# Patient Record
Sex: FEMALE | Race: WHITE | NOT HISPANIC OR LATINO | URBAN - METROPOLITAN AREA
[De-identification: names, ages, dates, MRNs, and addresses within clinical notes are randomized per-mention and may not be internally consistent; named-entity substitution may affect disease eponyms.]

---

## 2018-06-01 ENCOUNTER — OUTPATIENT (OUTPATIENT)
Dept: OUTPATIENT SERVICES | Facility: HOSPITAL | Age: 68
LOS: 1 days | Discharge: HOME | End: 2018-06-01
Payer: MEDICARE

## 2018-06-01 VITALS
WEIGHT: 262.57 LBS | TEMPERATURE: 98 F | SYSTOLIC BLOOD PRESSURE: 113 MMHG | HEIGHT: 65 IN | HEART RATE: 69 BPM | RESPIRATION RATE: 16 BRPM | OXYGEN SATURATION: 97 % | DIASTOLIC BLOOD PRESSURE: 73 MMHG

## 2018-06-01 DIAGNOSIS — Z98.890 OTHER SPECIFIED POSTPROCEDURAL STATES: Chronic | ICD-10-CM

## 2018-06-01 DIAGNOSIS — Z01.818 ENCOUNTER FOR OTHER PREPROCEDURAL EXAMINATION: ICD-10-CM

## 2018-06-01 DIAGNOSIS — Z90.49 ACQUIRED ABSENCE OF OTHER SPECIFIED PARTS OF DIGESTIVE TRACT: Chronic | ICD-10-CM

## 2018-06-01 DIAGNOSIS — E78.5 HYPERLIPIDEMIA, UNSPECIFIED: ICD-10-CM

## 2018-06-01 DIAGNOSIS — E03.9 HYPOTHYROIDISM, UNSPECIFIED: ICD-10-CM

## 2018-06-01 DIAGNOSIS — M19.011 PRIMARY OSTEOARTHRITIS, RIGHT SHOULDER: ICD-10-CM

## 2018-06-01 DIAGNOSIS — I10 ESSENTIAL (PRIMARY) HYPERTENSION: ICD-10-CM

## 2018-06-01 DIAGNOSIS — Z90.89 ACQUIRED ABSENCE OF OTHER ORGANS: Chronic | ICD-10-CM

## 2018-06-01 DIAGNOSIS — Z96.612 PRESENCE OF LEFT ARTIFICIAL SHOULDER JOINT: Chronic | ICD-10-CM

## 2018-06-01 DIAGNOSIS — E11.9 TYPE 2 DIABETES MELLITUS WITHOUT COMPLICATIONS: ICD-10-CM

## 2018-06-01 DIAGNOSIS — Z90.710 ACQUIRED ABSENCE OF BOTH CERVIX AND UTERUS: Chronic | ICD-10-CM

## 2018-06-01 DIAGNOSIS — Z98.51 TUBAL LIGATION STATUS: Chronic | ICD-10-CM

## 2018-06-01 LAB
ANION GAP SERPL CALC-SCNC: 6 MMOL/L — SIGNIFICANT CHANGE UP (ref 5–17)
APTT BLD: 29.5 SEC — SIGNIFICANT CHANGE UP (ref 27.5–37.4)
BASOPHILS # BLD AUTO: 0.03 K/UL — SIGNIFICANT CHANGE UP (ref 0–0.2)
BASOPHILS NFR BLD AUTO: 0.5 % — SIGNIFICANT CHANGE UP (ref 0–2)
BUN SERPL-MCNC: 16 MG/DL — SIGNIFICANT CHANGE UP (ref 7–23)
CALCIUM SERPL-MCNC: 9.3 MG/DL — SIGNIFICANT CHANGE UP (ref 8.5–10.1)
CHLORIDE SERPL-SCNC: 107 MMOL/L — SIGNIFICANT CHANGE UP (ref 96–108)
CO2 SERPL-SCNC: 31 MMOL/L — SIGNIFICANT CHANGE UP (ref 22–31)
CREAT SERPL-MCNC: 0.73 MG/DL — SIGNIFICANT CHANGE UP (ref 0.5–1.3)
EOSINOPHIL # BLD AUTO: 0.16 K/UL — SIGNIFICANT CHANGE UP (ref 0–0.5)
EOSINOPHIL NFR BLD AUTO: 2.6 % — SIGNIFICANT CHANGE UP (ref 0–6)
GLUCOSE SERPL-MCNC: 98 MG/DL — SIGNIFICANT CHANGE UP (ref 70–99)
HBA1C BLD-MCNC: 5.8 % — HIGH (ref 4–5.6)
HCT VFR BLD CALC: 38.7 % — SIGNIFICANT CHANGE UP (ref 34.5–45)
HGB BLD-MCNC: 12.1 G/DL — SIGNIFICANT CHANGE UP (ref 11.5–15.5)
IMM GRANULOCYTES NFR BLD AUTO: 0.2 % — SIGNIFICANT CHANGE UP (ref 0–1.5)
INR BLD: 1.04 RATIO — SIGNIFICANT CHANGE UP (ref 0.88–1.16)
LYMPHOCYTES # BLD AUTO: 1.71 K/UL — SIGNIFICANT CHANGE UP (ref 1–3.3)
LYMPHOCYTES # BLD AUTO: 27.7 % — SIGNIFICANT CHANGE UP (ref 13–44)
MCHC RBC-ENTMCNC: 29.1 PG — SIGNIFICANT CHANGE UP (ref 27–34)
MCHC RBC-ENTMCNC: 31.3 GM/DL — LOW (ref 32–36)
MCV RBC AUTO: 93 FL — SIGNIFICANT CHANGE UP (ref 80–100)
MONOCYTES # BLD AUTO: 0.46 K/UL — SIGNIFICANT CHANGE UP (ref 0–0.9)
MONOCYTES NFR BLD AUTO: 7.5 % — SIGNIFICANT CHANGE UP (ref 2–14)
MRSA PCR RESULT.: SIGNIFICANT CHANGE UP
NEUTROPHILS # BLD AUTO: 3.8 K/UL — SIGNIFICANT CHANGE UP (ref 1.8–7.4)
NEUTROPHILS NFR BLD AUTO: 61.5 % — SIGNIFICANT CHANGE UP (ref 43–77)
PLATELET # BLD AUTO: 273 K/UL — SIGNIFICANT CHANGE UP (ref 150–400)
POTASSIUM SERPL-MCNC: 4.1 MMOL/L — SIGNIFICANT CHANGE UP (ref 3.5–5.3)
POTASSIUM SERPL-SCNC: 4.1 MMOL/L — SIGNIFICANT CHANGE UP (ref 3.5–5.3)
PROTHROM AB SERPL-ACNC: 11.3 SEC — SIGNIFICANT CHANGE UP (ref 9.8–12.7)
RBC # BLD: 4.16 M/UL — SIGNIFICANT CHANGE UP (ref 3.8–5.2)
RBC # FLD: 13.3 % — SIGNIFICANT CHANGE UP (ref 10.3–14.5)
S AUREUS DNA NOSE QL NAA+PROBE: DETECTED
SODIUM SERPL-SCNC: 144 MMOL/L — SIGNIFICANT CHANGE UP (ref 135–145)
WBC # BLD: 6.17 K/UL — SIGNIFICANT CHANGE UP (ref 3.8–10.5)
WBC # FLD AUTO: 6.17 K/UL — SIGNIFICANT CHANGE UP (ref 3.8–10.5)

## 2018-06-01 PROCEDURE — 93010 ELECTROCARDIOGRAM REPORT: CPT | Mod: NC

## 2018-06-01 RX ORDER — ATORVASTATIN CALCIUM 80 MG/1
1 TABLET, FILM COATED ORAL
Qty: 0 | Refills: 0 | COMMUNITY

## 2018-06-01 RX ORDER — AMLODIPINE BESYLATE 2.5 MG/1
1 TABLET ORAL
Qty: 0 | Refills: 0 | COMMUNITY

## 2018-06-01 RX ORDER — NADOLOL 80 MG/1
1 TABLET ORAL
Qty: 0 | Refills: 0 | COMMUNITY

## 2018-06-01 RX ORDER — LIRAGLUTIDE 6 MG/ML
1 INJECTION SUBCUTANEOUS
Qty: 0 | Refills: 0 | COMMUNITY

## 2018-06-01 RX ORDER — LEVOTHYROXINE SODIUM 125 MCG
1 TABLET ORAL
Qty: 0 | Refills: 0 | COMMUNITY

## 2018-06-01 RX ORDER — ASCORBIC ACID 60 MG
1 TABLET,CHEWABLE ORAL
Qty: 0 | Refills: 0 | COMMUNITY

## 2018-06-01 RX ORDER — OMEPRAZOLE 10 MG/1
1 CAPSULE, DELAYED RELEASE ORAL
Qty: 0 | Refills: 0 | COMMUNITY

## 2018-06-01 RX ORDER — CHOLECALCIFEROL (VITAMIN D3) 125 MCG
1 CAPSULE ORAL
Qty: 0 | Refills: 0 | COMMUNITY

## 2018-06-01 NOTE — H&P PST ADULT - HISTORY OF PRESENT ILLNESS
68F pmh htn, dm, gerd, hypothyroid, hl here for PST for scheduled Right total shoulder arthroplasty with open acromioclaviculare resection

## 2018-06-01 NOTE — H&P PST ADULT - NSANTHOSAYNRD_GEN_A_CORE
No. CAR screening performed.  STOP BANG Legend: 0-2 = LOW Risk; 3-4 = INTERMEDIATE Risk; 5-8 = HIGH Risk

## 2018-06-01 NOTE — H&P PST ADULT - ASSESSMENT
68F pmh htn, dm, gerd, hypothyroid, hl here for PST for scheduled Right total shoulder arthroplasty with open acromioclaviculare resection 68F pmh htn, dm, gerd, hypothyroid, hl here for PST for scheduled Right total shoulder arthroplasty with open acromioclaviculare resection    CAPRINI SCORE    AGE RELATED RISK FACTORS                                                       MOBILITY RELATED FACTORS  [ ] Age 41-60 years                                            (1 Point)                  [ ] Bed rest                                                        (1 Point)  [x ] Age: 61-74 years                                           (2 Points)                [ ] Plaster cast                                                   (2 Points)  [ ] Age= 75 years                                              (3 Points)                 [ ] Bed bound for more than 72 hours                   (2 Points)    DISEASE RELATED RISK FACTORS                                               GENDER SPECIFIC FACTORS  [x ] Edema in the lower extremities                       (1 Point)                  [ ] Pregnancy                                                     (1 Point)  [ ] Varicose veins                                               (1 Point)                  [ ] Post-partum < 6 weeks                                   (1 Point)             [x ] BMI > 25 Kg/m2                                            (1 Point)                  [ ] Hormonal therapy  or oral contraception            (1 Point)                 [ ] Sepsis (in the previous month)                        (1 Point)                  [ ] History of pregnancy complications  [ ] Pneumonia or serious lung disease                                               [ ] Unexplained or recurrent                       (1 Point)           (in the previous month)                               (1 Point)  [ ] Abnormal pulmonary function test                     (1 Point)                 SURGERY RELATED RISK FACTORS  [ ] Acute myocardial infarction                              (1 Point)                 [ ]  Section                                            (1 Point)  [ ] Congestive heart failure (in the previous month)  (1 Point)                 [ ] Minor surgery                                                 (1 Point)   [ ] Inflammatory bowel disease                             (1 Point)                 [ ] Arthroscopic surgery                                        (2 Points)  [ ] Central venous access                                    (2 Points)                [ ] General surgery lasting more than 45 minutes   (2 Points)       [ ] Stroke (in the previous month)                          (5 Points)               [x ] Elective arthroplasty                                        (5 Points)                                                                                                                                               HEMATOLOGY RELATED FACTORS                                                 TRAUMA RELATED RISK FACTORS  [ ] Prior episodes of VTE                                     (3 Points)                 [ ] Fracture of the hip, pelvis, or leg                       (5 Points)  [ ] Positive family history for VTE                         (3 Points)                 [ ] Acute spinal cord injury (in the previous month)  (5 Points)  [ ] Prothrombin 28568 A                                      (3 Points)                 [ ] Paralysis  (less than 1 month)                          (5 Points)  [ ] Factor V Leiden                                             (3 Points)                 [ ] Multiple Trauma within 1 month                         (5 Points)  [ ] Lupus anticoagulants                                     (3 Points)                                                           [ ] Anticardiolipin antibodies                                (3 Points)                                                       [ ] High homocysteine in the blood                      (3 Points)                                             [ ] Other congenital or acquired thrombophilia       (3 Points)                                                [ ] Heparin induced thrombocytopenia                  (3 Points)                                          Total Score [     9     ]

## 2018-06-01 NOTE — H&P PST ADULT - PSH
H/O carpal tunnel repair  bilaterally  S/P appendectomy  1964  S/P cholecystectomy  1967  S/P hysterectomy  1992  S/P shoulder replacement, left  2015  S/P shoulder surgery  Right ( 2002 )  S/P tonsillectomy  1960  S/P tubal ligation  1981  Status post trigger finger release  1986 and 1990

## 2018-06-01 NOTE — PATIENT PROFILE ADULT. - PSH
S/P appendectomy  1964  S/P cholecystectomy  1967  S/P hysterectomy  1992  S/P shoulder replacement, left  2015  S/P shoulder surgery  Right ( 2002 )  S/P tonsillectomy  1960  S/P tubal ligation  1981

## 2018-06-01 NOTE — H&P PST ADULT - PMH
Diabetes    GERD (gastroesophageal reflux disease)    Hyperlipidemia    Hypertension    Hypothyroidism    Migraines    Seasonal allergies

## 2018-06-03 RX ORDER — SODIUM CHLORIDE 9 MG/ML
3 INJECTION INTRAMUSCULAR; INTRAVENOUS; SUBCUTANEOUS EVERY 8 HOURS
Qty: 0 | Refills: 0 | Status: DISCONTINUED | OUTPATIENT
Start: 2018-06-11 | End: 2018-06-12

## 2018-06-03 RX ORDER — MUPIROCIN 20 MG/G
1 OINTMENT TOPICAL
Qty: 22 | Refills: 0 | OUTPATIENT
Start: 2018-06-03 | End: 2018-06-07

## 2018-06-10 ENCOUNTER — TRANSCRIPTION ENCOUNTER (OUTPATIENT)
Age: 68
End: 2018-06-10

## 2018-06-11 ENCOUNTER — TRANSCRIPTION ENCOUNTER (OUTPATIENT)
Age: 68
End: 2018-06-11

## 2018-06-11 ENCOUNTER — RESULT REVIEW (OUTPATIENT)
Age: 68
End: 2018-06-11

## 2018-06-11 ENCOUNTER — INPATIENT (INPATIENT)
Facility: HOSPITAL | Age: 68
LOS: 0 days | Discharge: HOME HEALTH | End: 2018-06-12
Attending: ORTHOPAEDIC SURGERY | Admitting: ORTHOPAEDIC SURGERY
Payer: MEDICARE

## 2018-06-11 VITALS
RESPIRATION RATE: 18 BRPM | WEIGHT: 265 LBS | DIASTOLIC BLOOD PRESSURE: 83 MMHG | TEMPERATURE: 97 F | HEIGHT: 65 IN | OXYGEN SATURATION: 98 % | HEART RATE: 69 BPM | SYSTOLIC BLOOD PRESSURE: 148 MMHG

## 2018-06-11 DIAGNOSIS — Z98.890 OTHER SPECIFIED POSTPROCEDURAL STATES: Chronic | ICD-10-CM

## 2018-06-11 DIAGNOSIS — Z98.51 TUBAL LIGATION STATUS: Chronic | ICD-10-CM

## 2018-06-11 DIAGNOSIS — Z96.612 PRESENCE OF LEFT ARTIFICIAL SHOULDER JOINT: Chronic | ICD-10-CM

## 2018-06-11 DIAGNOSIS — Z90.710 ACQUIRED ABSENCE OF BOTH CERVIX AND UTERUS: Chronic | ICD-10-CM

## 2018-06-11 DIAGNOSIS — E78.5 HYPERLIPIDEMIA, UNSPECIFIED: ICD-10-CM

## 2018-06-11 DIAGNOSIS — E11.9 TYPE 2 DIABETES MELLITUS WITHOUT COMPLICATIONS: ICD-10-CM

## 2018-06-11 DIAGNOSIS — I10 ESSENTIAL (PRIMARY) HYPERTENSION: ICD-10-CM

## 2018-06-11 DIAGNOSIS — Z90.49 ACQUIRED ABSENCE OF OTHER SPECIFIED PARTS OF DIGESTIVE TRACT: Chronic | ICD-10-CM

## 2018-06-11 DIAGNOSIS — E03.9 HYPOTHYROIDISM, UNSPECIFIED: ICD-10-CM

## 2018-06-11 DIAGNOSIS — M19.90 UNSPECIFIED OSTEOARTHRITIS, UNSPECIFIED SITE: ICD-10-CM

## 2018-06-11 DIAGNOSIS — Z90.89 ACQUIRED ABSENCE OF OTHER ORGANS: Chronic | ICD-10-CM

## 2018-06-11 PROCEDURE — 99223 1ST HOSP IP/OBS HIGH 75: CPT

## 2018-06-11 PROCEDURE — 88309 TISSUE EXAM BY PATHOLOGIST: CPT | Mod: 26

## 2018-06-11 PROCEDURE — 88311 DECALCIFY TISSUE: CPT | Mod: 26

## 2018-06-11 RX ORDER — DEXTROSE 50 % IN WATER 50 %
15 SYRINGE (ML) INTRAVENOUS ONCE
Qty: 0 | Refills: 0 | Status: DISCONTINUED | OUTPATIENT
Start: 2018-06-11 | End: 2018-06-12

## 2018-06-11 RX ORDER — ACETAMINOPHEN 500 MG
1000 TABLET ORAL ONCE
Qty: 0 | Refills: 0 | Status: COMPLETED | OUTPATIENT
Start: 2018-06-11 | End: 2018-06-11

## 2018-06-11 RX ORDER — OXYCODONE HYDROCHLORIDE 5 MG/1
5 TABLET ORAL EVERY 4 HOURS
Qty: 0 | Refills: 0 | Status: DISCONTINUED | OUTPATIENT
Start: 2018-06-11 | End: 2018-06-12

## 2018-06-11 RX ORDER — FENTANYL CITRATE 50 UG/ML
50 INJECTION INTRAVENOUS
Qty: 0 | Refills: 0 | Status: DISCONTINUED | OUTPATIENT
Start: 2018-06-11 | End: 2018-06-11

## 2018-06-11 RX ORDER — DEXTROSE 50 % IN WATER 50 %
25 SYRINGE (ML) INTRAVENOUS ONCE
Qty: 0 | Refills: 0 | Status: DISCONTINUED | OUTPATIENT
Start: 2018-06-11 | End: 2018-06-12

## 2018-06-11 RX ORDER — SODIUM CHLORIDE 9 MG/ML
1000 INJECTION, SOLUTION INTRAVENOUS
Qty: 0 | Refills: 0 | Status: DISCONTINUED | OUTPATIENT
Start: 2018-06-11 | End: 2018-06-11

## 2018-06-11 RX ORDER — AMLODIPINE BESYLATE 2.5 MG/1
5 TABLET ORAL DAILY
Qty: 0 | Refills: 0 | Status: DISCONTINUED | OUTPATIENT
Start: 2018-06-11 | End: 2018-06-12

## 2018-06-11 RX ORDER — TRANEXAMIC ACID 100 MG/ML
1000 INJECTION, SOLUTION INTRAVENOUS ONCE
Qty: 0 | Refills: 0 | Status: COMPLETED | OUTPATIENT
Start: 2018-06-11 | End: 2018-06-11

## 2018-06-11 RX ORDER — DEXTROSE 50 % IN WATER 50 %
12.5 SYRINGE (ML) INTRAVENOUS ONCE
Qty: 0 | Refills: 0 | Status: DISCONTINUED | OUTPATIENT
Start: 2018-06-11 | End: 2018-06-12

## 2018-06-11 RX ORDER — METOCLOPRAMIDE HCL 10 MG
10 TABLET ORAL ONCE
Qty: 0 | Refills: 0 | Status: DISCONTINUED | OUTPATIENT
Start: 2018-06-11 | End: 2018-06-11

## 2018-06-11 RX ORDER — DOCUSATE SODIUM 100 MG
100 CAPSULE ORAL THREE TIMES A DAY
Qty: 0 | Refills: 0 | Status: DISCONTINUED | OUTPATIENT
Start: 2018-06-11 | End: 2018-06-12

## 2018-06-11 RX ORDER — SODIUM CHLORIDE 9 MG/ML
1000 INJECTION, SOLUTION INTRAVENOUS
Qty: 0 | Refills: 0 | Status: DISCONTINUED | OUTPATIENT
Start: 2018-06-11 | End: 2018-06-12

## 2018-06-11 RX ORDER — LEVOTHYROXINE SODIUM 125 MCG
137 TABLET ORAL DAILY
Qty: 0 | Refills: 0 | Status: DISCONTINUED | OUTPATIENT
Start: 2018-06-11 | End: 2018-06-12

## 2018-06-11 RX ORDER — ASCORBIC ACID 60 MG
500 TABLET,CHEWABLE ORAL DAILY
Qty: 0 | Refills: 0 | Status: DISCONTINUED | OUTPATIENT
Start: 2018-06-11 | End: 2018-06-12

## 2018-06-11 RX ORDER — OXYCODONE HYDROCHLORIDE 5 MG/1
10 TABLET ORAL EVERY 4 HOURS
Qty: 0 | Refills: 0 | Status: DISCONTINUED | OUTPATIENT
Start: 2018-06-11 | End: 2018-06-12

## 2018-06-11 RX ORDER — CEFAZOLIN SODIUM 1 G
2000 VIAL (EA) INJECTION EVERY 8 HOURS
Qty: 0 | Refills: 0 | Status: COMPLETED | OUTPATIENT
Start: 2018-06-11 | End: 2018-06-11

## 2018-06-11 RX ORDER — MUPIROCIN 20 MG/G
1 OINTMENT TOPICAL
Qty: 0 | Refills: 0 | Status: DISCONTINUED | OUTPATIENT
Start: 2018-06-11 | End: 2018-06-11

## 2018-06-11 RX ORDER — ATORVASTATIN CALCIUM 80 MG/1
10 TABLET, FILM COATED ORAL AT BEDTIME
Qty: 0 | Refills: 0 | Status: DISCONTINUED | OUTPATIENT
Start: 2018-06-11 | End: 2018-06-12

## 2018-06-11 RX ORDER — METOCLOPRAMIDE HCL 10 MG
10 TABLET ORAL ONCE
Qty: 0 | Refills: 0 | Status: COMPLETED | OUTPATIENT
Start: 2018-06-11 | End: 2018-06-11

## 2018-06-11 RX ORDER — SODIUM CHLORIDE 9 MG/ML
1000 INJECTION INTRAMUSCULAR; INTRAVENOUS; SUBCUTANEOUS
Qty: 0 | Refills: 0 | Status: DISCONTINUED | OUTPATIENT
Start: 2018-06-11 | End: 2018-06-12

## 2018-06-11 RX ORDER — GLUCAGON INJECTION, SOLUTION 0.5 MG/.1ML
1 INJECTION, SOLUTION SUBCUTANEOUS ONCE
Qty: 0 | Refills: 0 | Status: DISCONTINUED | OUTPATIENT
Start: 2018-06-11 | End: 2018-06-12

## 2018-06-11 RX ORDER — INSULIN LISPRO 100/ML
VIAL (ML) SUBCUTANEOUS
Qty: 0 | Refills: 0 | Status: DISCONTINUED | OUTPATIENT
Start: 2018-06-11 | End: 2018-06-12

## 2018-06-11 RX ORDER — ASPIRIN/CALCIUM CARB/MAGNESIUM 324 MG
325 TABLET ORAL DAILY
Qty: 0 | Refills: 0 | Status: DISCONTINUED | OUTPATIENT
Start: 2018-06-11 | End: 2018-06-12

## 2018-06-11 RX ORDER — METOPROLOL TARTRATE 50 MG
25 TABLET ORAL
Qty: 0 | Refills: 0 | Status: DISCONTINUED | OUTPATIENT
Start: 2018-06-11 | End: 2018-06-12

## 2018-06-11 RX ORDER — PANTOPRAZOLE SODIUM 20 MG/1
40 TABLET, DELAYED RELEASE ORAL
Qty: 0 | Refills: 0 | Status: DISCONTINUED | OUTPATIENT
Start: 2018-06-11 | End: 2018-06-12

## 2018-06-11 RX ORDER — HYDROMORPHONE HYDROCHLORIDE 2 MG/ML
0.5 INJECTION INTRAMUSCULAR; INTRAVENOUS; SUBCUTANEOUS
Qty: 0 | Refills: 0 | Status: DISCONTINUED | OUTPATIENT
Start: 2018-06-11 | End: 2018-06-11

## 2018-06-11 RX ADMIN — SODIUM CHLORIDE 100 MILLILITER(S): 9 INJECTION, SOLUTION INTRAVENOUS at 10:33

## 2018-06-11 RX ADMIN — ATORVASTATIN CALCIUM 10 MILLIGRAM(S): 80 TABLET, FILM COATED ORAL at 21:11

## 2018-06-11 RX ADMIN — Medication 10 MILLIGRAM(S): at 11:17

## 2018-06-11 RX ADMIN — Medication 100 MILLIGRAM(S): at 21:11

## 2018-06-11 RX ADMIN — SODIUM CHLORIDE 3 MILLILITER(S): 9 INJECTION INTRAMUSCULAR; INTRAVENOUS; SUBCUTANEOUS at 13:49

## 2018-06-11 RX ADMIN — Medication 1000 MILLIGRAM(S): at 10:52

## 2018-06-11 RX ADMIN — Medication 137 MICROGRAM(S): at 16:58

## 2018-06-11 RX ADMIN — SODIUM CHLORIDE 75 MILLILITER(S): 9 INJECTION INTRAMUSCULAR; INTRAVENOUS; SUBCUTANEOUS at 17:32

## 2018-06-11 RX ADMIN — Medication 325 MILLIGRAM(S): at 13:47

## 2018-06-11 RX ADMIN — Medication 400 MILLIGRAM(S): at 10:33

## 2018-06-11 RX ADMIN — OXYCODONE HYDROCHLORIDE 10 MILLIGRAM(S): 5 TABLET ORAL at 20:06

## 2018-06-11 RX ADMIN — Medication 25 MILLIGRAM(S): at 17:32

## 2018-06-11 RX ADMIN — OXYCODONE HYDROCHLORIDE 10 MILLIGRAM(S): 5 TABLET ORAL at 21:06

## 2018-06-11 RX ADMIN — Medication 100 MILLIGRAM(S): at 13:47

## 2018-06-11 RX ADMIN — TRANEXAMIC ACID 220 MILLIGRAM(S): 100 INJECTION, SOLUTION INTRAVENOUS at 10:56

## 2018-06-11 RX ADMIN — SODIUM CHLORIDE 3 MILLILITER(S): 9 INJECTION INTRAMUSCULAR; INTRAVENOUS; SUBCUTANEOUS at 21:14

## 2018-06-11 RX ADMIN — Medication 2: at 16:58

## 2018-06-11 NOTE — DISCHARGE NOTE ADULT - CARE PROVIDER_API CALL
Elkin Marcelino), Orthopaedic Surgery  [address] [place] [address]  Phone: [phone]  Fax: [phone]

## 2018-06-11 NOTE — DISCHARGE NOTE ADULT - PLAN OF CARE
Improve Function,  Pain Keep Dressing Clean, Dry and Intact. May shower on POD#5 with Dressing on. Dressing may be removed on POD#7. Please do not scrub, soak, peel or pick at the dressing. No creams, lotions, or oils over dressing. May shower on POD#5 and let water run over dressing, no baths. Pat dry once out of shower.     If dressing is saturated from border to border. Remove dressing and cover with clean dry dressing.

## 2018-06-11 NOTE — DISCHARGE NOTE ADULT - PATIENT PORTAL LINK FT
You can access the [email or web address] Patient Portal, offered by [facility], by registering with the following website: [email or web address]

## 2018-06-11 NOTE — BRIEF OPERATIVE NOTE - PROCEDURE
<<-----Click on this checkbox to enter Procedure Shoulder arthroplasty, right  06/11/2018  S/P right total shoulder arthoplasty  Active  LUKAS

## 2018-06-11 NOTE — DISCHARGE NOTE ADULT - MEDICATION SUMMARY - MEDICATIONS TO TAKE
I will START or STAY ON the medications listed below when I get home from the hospital:    aspirin 325 mg oral tablet  -- 1 tab(s) by mouth once a day MDD:1 Tab  -- Indication: For RIGHT TOTAL SHOULDER ARTHROPLASTY WITH OPEN ACROMMIOCLAVICUL    oxyCODONE 10 mg oral tablet  -- 1 tab(s) by mouth every 4 hours, As needed, Moderate Pain MDD:6 Tabs  -- Indication: For RIGHT TOTAL SHOULDER ARTHROPLASTY WITH OPEN ACROMMIOCLAVICUL    enalapril 5 mg oral tablet  -- 1 tab(s) by mouth once a day  -- Indication: For RIGHT TOTAL SHOULDER ARTHROPLASTY WITH OPEN ACROMMIOCLAVICUL    Victoza  -- 1 dose(s) subcutaneous once a day  -- Indication: For RIGHT TOTAL SHOULDER ARTHROPLASTY WITH OPEN ACROMMIOCLAVICUL    atorvastatin 10 mg oral tablet  -- 1 tab(s) by mouth once a day  -- Indication: For RIGHT TOTAL SHOULDER ARTHROPLASTY WITH OPEN ACROMMIOCLAVICUL    nadolol 40 mg oral tablet  -- 1 tab(s) by mouth once a day  -- Indication: For RIGHT TOTAL SHOULDER ARTHROPLASTY WITH OPEN ACROMMIOCLAVICUL    amLODIPine 5 mg oral tablet  -- 1 tab(s) by mouth once a day  -- Indication: For RIGHT TOTAL SHOULDER ARTHROPLASTY WITH OPEN ACROMMIOCLAVICUL    docusate sodium 100 mg oral capsule  -- 1 cap(s) by mouth 3 times a day  -- Indication: For RIGHT TOTAL SHOULDER ARTHROPLASTY WITH OPEN ACROMMIOCLAVICUL    omeprazole 20 mg oral delayed release capsule  -- 1 cap(s) by mouth once a day  -- Indication: For RIGHT TOTAL SHOULDER ARTHROPLASTY WITH OPEN ACROMMIOCLAVICUL    levothyroxine 137 mcg (0.137 mg) oral tablet  -- 1 tab(s) by mouth once a day  -- Indication: For RIGHT TOTAL SHOULDER ARTHROPLASTY WITH OPEN ACROMMIOCLAVICUL    Multiple Vitamins oral tablet  -- 1 tab(s) by mouth once a day  -- Indication: For RIGHT TOTAL SHOULDER ARTHROPLASTY WITH OPEN ACROMMIOCLAVICUL    Vitamin C 500 mg oral tablet  -- 1 tab(s) by mouth once a day  -- Indication: For RIGHT TOTAL SHOULDER ARTHROPLASTY WITH OPEN ACROMMIOCLAVICUL    Vitamin D3 2000 intl units oral capsule  -- 1 cap(s) by mouth once a day  -- Indication: For RIGHT TOTAL SHOULDER ARTHROPLASTY WITH OPEN ACROMMIOCLAVICUL

## 2018-06-11 NOTE — DISCHARGE NOTE ADULT - MEDICATION SUMMARY - MEDICATIONS TO STOP TAKING
I will STOP taking the medications listed below when I get home from the hospital:    diclofenac  -- 75 milligram(s) by mouth once a day    aspirin 81 mg oral tablet  -- 1 tab(s) by mouth once a day    mupirocin 2% topical ointment  -- Apply on skin to affected area 2 times a day   Intranasaly  -- For external use only.

## 2018-06-11 NOTE — CONSULT NOTE ADULT - SUBJECTIVE AND OBJECTIVE BOX
HPI: Pt is a 67 yo female with ho OA, HTN, DM, hypothyroidism, HLD with chronic R shoulder pain now s/p R shoulder arthroplasty      PAST MEDICAL & SURGICAL HISTORY:  Migraines  Diabetes  GERD (gastroesophageal reflux disease)  Seasonal allergies  Hypothyroidism  Hypertension  Hyperlipidemia  Status post trigger finger release: 1986 and 1990  H/O carpal tunnel repair: bilaterally  S/P shoulder surgery: Right ( 2002 )  S/P shoulder replacement, left: 2015  S/P cholecystectomy: 1967  S/P tubal ligation: 1981  S/P hysterectomy: 1992  S/P appendectomy: 1964  S/P tonsillectomy: 1960      Review of Systems:   CONSTITUTIONAL: No fever, weight loss, or fatigue  EYES: No eye pain, visual disturbances, or discharge  ENMT:  No difficulty hearing, tinnitus, vertigo; No sinus or throat pain  NECK: No pain or stiffness  RESPIRATORY: No cough, wheezing, chills or hemoptysis; No shortness of breath  CARDIOVASCULAR: No chest pain, palpitations, dizziness, or leg swelling  GASTROINTESTINAL: No abdominal or epigastric pain. No nausea, vomiting, or hematemesis; No diarrhea or constipation. No melena or hematochezia.  GENITOURINARY: No dysuria, frequency, hematuria, or incontinence  NEUROLOGICAL: No headaches, memory loss, loss of strength, numbness, or tremors  SKIN: No itching, burning, rashes, or lesions   LYMPH NODES: No enlarged glands  ENDOCRINE: No heat or cold intolerance; No hair loss  MUSCULOSKELETAL: chronic R shoulder pain, limited ROM  PSYCHIATRIC: No depression, anxiety, mood swings, or difficulty sleeping  HEME/LYMPH: No easy bruising, or bleeding gums  ALLERGY AND IMMUNOLOGIC: No hives or eczema    Allergies    No Known Allergies    Intolerances    Zithromax (Headache)      Social History:     FAMILY HISTORY:      MEDICATIONS  (STANDING):  amLODIPine   Tablet 5 milliGRAM(s) Oral daily  ascorbic acid 500 milliGRAM(s) Oral daily  aspirin 325 milliGRAM(s) Oral daily  atorvastatin 10 milliGRAM(s) Oral at bedtime  ceFAZolin   IVPB 2000 milliGRAM(s) IV Intermittent every 8 hours  dextrose 5%. 1000 milliLiter(s) (50 mL/Hr) IV Continuous <Continuous>  dextrose 50% Injectable 12.5 Gram(s) IV Push once  dextrose 50% Injectable 25 Gram(s) IV Push once  dextrose 50% Injectable 25 Gram(s) IV Push once  docusate sodium 100 milliGRAM(s) Oral three times a day  enalapril 5 milliGRAM(s) Oral daily  insulin lispro (HumaLOG) corrective regimen sliding scale   SubCutaneous three times a day before meals  levothyroxine 137 MICROGram(s) Oral daily  multivitamin 1 Tablet(s) Oral daily  mupirocin 2% Ointment 1 Application(s) Topical two times a day  pantoprazole    Tablet 40 milliGRAM(s) Oral before breakfast  sodium chloride 0.9% lock flush 3 milliLiter(s) IV Push every 8 hours  sodium chloride 0.9%. 1000 milliLiter(s) (75 mL/Hr) IV Continuous <Continuous>    MEDICATIONS  (PRN):  dextrose 40% Gel 15 Gram(s) Oral once PRN Blood Glucose LESS THAN 70 milliGRAM(s)/deciliter  glucagon  Injectable 1 milliGRAM(s) IntraMuscular once PRN Glucose LESS THAN 70 milligrams/deciliter  oxyCODONE    IR 10 milliGRAM(s) Oral every 4 hours PRN Moderate Pain  oxyCODONE    IR 5 milliGRAM(s) Oral every 4 hours PRN Mild Pain        CAPILLARY BLOOD GLUCOSE  POCT Blood Glucose.: 113 mg/dL (11 Jun 2018 07:07)            PHYSICAL EXAM:  GENERAL: NAD, well-developed  HEAD:  Atraumatic, Normocephalic  EYES: EOMI, PERRLA, conjunctiva and sclera clear  NECK: Supple, No JVD  CHEST/LUNG: good AE ant/lat  HEART: Regular rate and rhythm  ABDOMEN: Soft, Nontender, Nondistended; Bowel sounds present  EXTREMITIES:  No clubbing, cyanosis, or edema; R shoulder bulky dressing in place with drain; bloody drainage  PSYCH: AAOx3  NEUROLOGY: able to move fingers R hand      LABS:

## 2018-06-11 NOTE — PROGRESS NOTE ADULT - SUBJECTIVE AND OBJECTIVE BOX
Post-op Check   POD#0 s/p Right TSA  68yFemale Patient seen and examined, Pain controlled  Patient Denies SOB, CP, N/V/D       PE: Right Shoulder/UE: Dressing C/D/I, Sensation/motor intact, Radial 2+, FROM wrist/fin                A: As above   P: Pain Control       DVT Prophylaxis      Incentive spirometry      PT NWB RUE      Isometric exercises      Discharge Planning      All the above discussed and understood by pt       Ortho to F/U Post-op Check   POD#0 s/p Right TSA  68yFemale Patient seen and examined, Pain controlled  Patient Denies SOB, CP, N/V/D       PE: Right Shoulder/UE: Dressing C/D/I, Sensation/motor intact, Radial 2+, Moving Fingers                A: As above   P: Pain Control       DVT Prophylaxis      Incentive spirometry      PT NWB RUE      Isometric exercises      Discharge Planning      All the above discussed and understood by pt       Ortho to F/U

## 2018-06-11 NOTE — DISCHARGE NOTE ADULT - HOSPITAL COURSE
68yFemale with history of Right Shoulder Pain presenting for Right TSA by Dr. licona [Date of Sx]. Risk and benefits of surgery were explained to the patient. The patient understood and agreed to proceed with surgery. Patient underwent the procedure with no intraoperative complications. Pt was brought in stable condition to the PACU. Once stable in PACU, pt was brought to the floor. During hospital stay pt was followed, during this admission. Pt had an uneventful hospital course. Pt is Stable for Discharge with Home Care Services and PT

## 2018-06-11 NOTE — DISCHARGE NOTE ADULT - CARE PLAN
Principal Discharge DX:	Osteoarthritis  Goal:	Improve Function,  Pain  Assessment and plan of treatment:	Keep Dressing Clean, Dry and Intact. May shower on POD#5 with Dressing on. Dressing may be removed on POD#7. Please do not scrub, soak, peel or pick at the dressing. No creams, lotions, or oils over dressing. May shower on POD#5 and let water run over dressing, no baths. Pat dry once out of shower.     If dressing is saturated from border to border. Remove dressing and cover with clean dry dressing.

## 2018-06-11 NOTE — CONSULT NOTE ADULT - PROBLEM SELECTOR RECOMMENDATION 3
nadolol not on formulary, spoke with pharmacy; 25 BID metoprolol is equivalent  cont enalapril, amlodipine

## 2018-06-12 VITALS
OXYGEN SATURATION: 97 % | TEMPERATURE: 98 F | SYSTOLIC BLOOD PRESSURE: 124 MMHG | HEART RATE: 63 BPM | RESPIRATION RATE: 18 BRPM | DIASTOLIC BLOOD PRESSURE: 75 MMHG

## 2018-06-12 LAB
ALBUMIN SERPL ELPH-MCNC: 3.1 G/DL — LOW (ref 3.3–5)
ALP SERPL-CCNC: 82 U/L — SIGNIFICANT CHANGE UP (ref 40–120)
ALT FLD-CCNC: 20 U/L — SIGNIFICANT CHANGE UP (ref 12–78)
ANION GAP SERPL CALC-SCNC: 8 MMOL/L — SIGNIFICANT CHANGE UP (ref 5–17)
AST SERPL-CCNC: 21 U/L — SIGNIFICANT CHANGE UP (ref 15–37)
BASOPHILS # BLD AUTO: 0.02 K/UL — SIGNIFICANT CHANGE UP (ref 0–0.2)
BASOPHILS NFR BLD AUTO: 0.1 % — SIGNIFICANT CHANGE UP (ref 0–2)
BILIRUB SERPL-MCNC: 0.3 MG/DL — SIGNIFICANT CHANGE UP (ref 0.2–1.2)
BUN SERPL-MCNC: 13 MG/DL — SIGNIFICANT CHANGE UP (ref 7–23)
CALCIUM SERPL-MCNC: 8.4 MG/DL — LOW (ref 8.5–10.1)
CHLORIDE SERPL-SCNC: 107 MMOL/L — SIGNIFICANT CHANGE UP (ref 96–108)
CO2 SERPL-SCNC: 27 MMOL/L — SIGNIFICANT CHANGE UP (ref 22–31)
CREAT SERPL-MCNC: 0.64 MG/DL — SIGNIFICANT CHANGE UP (ref 0.5–1.3)
EOSINOPHIL # BLD AUTO: 0 K/UL — SIGNIFICANT CHANGE UP (ref 0–0.5)
EOSINOPHIL NFR BLD AUTO: 0 % — SIGNIFICANT CHANGE UP (ref 0–6)
GLUCOSE SERPL-MCNC: 135 MG/DL — HIGH (ref 70–99)
HCT VFR BLD CALC: 32.6 % — LOW (ref 34.5–45)
HGB BLD-MCNC: 10.3 G/DL — LOW (ref 11.5–15.5)
IMM GRANULOCYTES NFR BLD AUTO: 0.4 % — SIGNIFICANT CHANGE UP (ref 0–1.5)
LYMPHOCYTES # BLD AUTO: 1.46 K/UL — SIGNIFICANT CHANGE UP (ref 1–3.3)
LYMPHOCYTES # BLD AUTO: 9.6 % — LOW (ref 13–44)
MCHC RBC-ENTMCNC: 29.4 PG — SIGNIFICANT CHANGE UP (ref 27–34)
MCHC RBC-ENTMCNC: 31.6 GM/DL — LOW (ref 32–36)
MCV RBC AUTO: 93.1 FL — SIGNIFICANT CHANGE UP (ref 80–100)
MONOCYTES # BLD AUTO: 1.3 K/UL — HIGH (ref 0–0.9)
MONOCYTES NFR BLD AUTO: 8.5 % — SIGNIFICANT CHANGE UP (ref 2–14)
NEUTROPHILS # BLD AUTO: 12.41 K/UL — HIGH (ref 1.8–7.4)
NEUTROPHILS NFR BLD AUTO: 81.4 % — HIGH (ref 43–77)
PLATELET # BLD AUTO: 260 K/UL — SIGNIFICANT CHANGE UP (ref 150–400)
POTASSIUM SERPL-MCNC: 3.7 MMOL/L — SIGNIFICANT CHANGE UP (ref 3.5–5.3)
POTASSIUM SERPL-SCNC: 3.7 MMOL/L — SIGNIFICANT CHANGE UP (ref 3.5–5.3)
PROT SERPL-MCNC: 6.6 GM/DL — SIGNIFICANT CHANGE UP (ref 6–8.3)
RBC # BLD: 3.5 M/UL — LOW (ref 3.8–5.2)
RBC # FLD: 13.6 % — SIGNIFICANT CHANGE UP (ref 10.3–14.5)
SODIUM SERPL-SCNC: 142 MMOL/L — SIGNIFICANT CHANGE UP (ref 135–145)
WBC # BLD: 15.25 K/UL — HIGH (ref 3.8–10.5)
WBC # FLD AUTO: 15.25 K/UL — HIGH (ref 3.8–10.5)

## 2018-06-12 PROCEDURE — 73030 X-RAY EXAM OF SHOULDER: CPT | Mod: 26,RT

## 2018-06-12 RX ORDER — ACETAMINOPHEN 500 MG
1000 TABLET ORAL ONCE
Qty: 0 | Refills: 0 | Status: COMPLETED | OUTPATIENT
Start: 2018-06-12 | End: 2018-06-12

## 2018-06-12 RX ORDER — ASPIRIN/CALCIUM CARB/MAGNESIUM 324 MG
1 TABLET ORAL
Qty: 0 | Refills: 0 | COMMUNITY

## 2018-06-12 RX ORDER — DICLOFENAC SODIUM 75 MG/1
75 TABLET, DELAYED RELEASE ORAL
Qty: 0 | Refills: 0 | COMMUNITY

## 2018-06-12 RX ORDER — ASPIRIN/CALCIUM CARB/MAGNESIUM 324 MG
1 TABLET ORAL
Qty: 30 | Refills: 0 | OUTPATIENT
Start: 2018-06-12 | End: 2018-07-11

## 2018-06-12 RX ORDER — OXYCODONE HYDROCHLORIDE 5 MG/1
1 TABLET ORAL
Qty: 42 | Refills: 0 | OUTPATIENT
Start: 2018-06-12 | End: 2018-06-18

## 2018-06-12 RX ORDER — DOCUSATE SODIUM 100 MG
1 CAPSULE ORAL
Qty: 0 | Refills: 0 | COMMUNITY
Start: 2018-06-12

## 2018-06-12 RX ADMIN — Medication 25 MILLIGRAM(S): at 05:11

## 2018-06-12 RX ADMIN — AMLODIPINE BESYLATE 5 MILLIGRAM(S): 2.5 TABLET ORAL at 05:13

## 2018-06-12 RX ADMIN — OXYCODONE HYDROCHLORIDE 10 MILLIGRAM(S): 5 TABLET ORAL at 06:00

## 2018-06-12 RX ADMIN — OXYCODONE HYDROCHLORIDE 10 MILLIGRAM(S): 5 TABLET ORAL at 05:12

## 2018-06-12 RX ADMIN — Medication 400 MILLIGRAM(S): at 06:48

## 2018-06-12 RX ADMIN — Medication 500 MILLIGRAM(S): at 11:47

## 2018-06-12 RX ADMIN — Medication 325 MILLIGRAM(S): at 11:47

## 2018-06-12 RX ADMIN — OXYCODONE HYDROCHLORIDE 10 MILLIGRAM(S): 5 TABLET ORAL at 01:23

## 2018-06-12 RX ADMIN — SODIUM CHLORIDE 3 MILLILITER(S): 9 INJECTION INTRAMUSCULAR; INTRAVENOUS; SUBCUTANEOUS at 05:12

## 2018-06-12 RX ADMIN — OXYCODONE HYDROCHLORIDE 10 MILLIGRAM(S): 5 TABLET ORAL at 11:00

## 2018-06-12 RX ADMIN — Medication 2: at 11:47

## 2018-06-12 RX ADMIN — Medication 137 MICROGRAM(S): at 05:11

## 2018-06-12 RX ADMIN — Medication 1 TABLET(S): at 11:47

## 2018-06-12 RX ADMIN — SODIUM CHLORIDE 3 MILLILITER(S): 9 INJECTION INTRAMUSCULAR; INTRAVENOUS; SUBCUTANEOUS at 15:24

## 2018-06-12 RX ADMIN — Medication 5 MILLIGRAM(S): at 05:11

## 2018-06-12 RX ADMIN — OXYCODONE HYDROCHLORIDE 10 MILLIGRAM(S): 5 TABLET ORAL at 00:23

## 2018-06-12 RX ADMIN — Medication 100 MILLIGRAM(S): at 05:11

## 2018-06-12 RX ADMIN — OXYCODONE HYDROCHLORIDE 10 MILLIGRAM(S): 5 TABLET ORAL at 10:07

## 2018-06-12 RX ADMIN — PANTOPRAZOLE SODIUM 40 MILLIGRAM(S): 20 TABLET, DELAYED RELEASE ORAL at 07:55

## 2018-06-12 NOTE — OCCUPATIONAL THERAPY INITIAL EVALUATION ADULT - PERTINENT HX OF CURRENT PROBLEM, REHAB EVAL
Pt was admitted to OR for elective surgery for right TSA due to pain, OA and DJD. Pt has a history of multiple comorbidities.

## 2018-06-12 NOTE — OCCUPATIONAL THERAPY INITIAL EVALUATION ADULT - PLANNED THERAPY INTERVENTIONS, OT EVAL
bed mobility training/cognitive, visual perceptual/fine motor coordination training/transfer training/ROM/strengthening/stretching/IADL retraining/balance training/joint mobilization/neuromuscular re-education/parent/caregiver training.../energy conservation techniques, endurance training/massage/motor coordination training/ADL retraining

## 2018-06-12 NOTE — OCCUPATIONAL THERAPY INITIAL EVALUATION ADULT - PERSONAL SAFETY AND JUDGMENT, REHAB EVAL
impaired/at risk behaviors demonstrated/Pt needed reminders for safety awareness when performing transfers in order to protect integrity of right shoulder when in RUE sling.

## 2018-06-12 NOTE — PHYSICAL THERAPY INITIAL EVALUATION ADULT - GENERAL OBSERVATIONS, REHAB EVAL
Patient encountered sat up on bedside chair. Vital signs as charted. Attachments: x1 hemovac, right shoulder post-surgical foam tape dressing intact c shoulder sling. AAOx4. Reports moderate pain; denies shortness of breath at rest. Neurovascularly intact to both upper limbs. Full AROM on both lower limbs. Advised on Total Shoulder Arthoplasty precautions of NWB to right upper limb; AAROM to right shoulder flex to 140, ER 40.

## 2018-06-12 NOTE — OCCUPATIONAL THERAPY INITIAL EVALUATION ADULT - LIVES WITH, PROFILE
spouse/in a private house with no steps for entry. Pt has 13 steps to negotiate to reach the 2nd floor, but will be residing on the main level during her recovery at home. Pt will be sleeping either on a sleeper chair or recliner. Pt has a shower/tub combination & standard toilet with grab bars.

## 2018-06-12 NOTE — OCCUPATIONAL THERAPY INITIAL EVALUATION ADULT - ANTICIPATED DISCHARGE DISPOSITION, OT EVAL
home w/ outpatient/Recommend home with OT referral for outpatient OT services to enable patient to safely perform ADL management, functional mobility & higher level balance.

## 2018-06-12 NOTE — OCCUPATIONAL THERAPY INITIAL EVALUATION ADULT - RANGE OF MOTION EXAMINATION, UPPER EXTREMITY
Right shoulder flexion was 60 degrees PROM, shoulder ER was 20 degrees PROM. Right elbow, wrist & digits WFL./Left UE Active ROM was WFL (within functional limits)

## 2018-06-12 NOTE — PHYSICAL THERAPY INITIAL EVALUATION ADULT - PLANNED THERAPY INTERVENTIONS, PT EVAL
joint mobilization/neuromuscular re-education/postural re-education/strengthening/transfer training/gait training/ROM/balance training/stretching

## 2018-06-12 NOTE — PHYSICAL THERAPY INITIAL EVALUATION ADULT - ADDITIONAL COMMENTS
Per patient, lives with  in private house without stairs to negotiate. Reports was independent in all gait, transfers and ADLs.

## 2018-06-12 NOTE — PHYSICAL THERAPY INITIAL EVALUATION ADULT - RANGE OF MOTION EXAMINATION, REHAB EVAL
bilateral lower extremity ROM was WFL (within functional limits)/deficits as listed below/Left UE ROM was WFL (within functional limits)/limited due to post-surgical  precautions to right upper limb

## 2018-06-12 NOTE — PROGRESS NOTE ADULT - SUBJECTIVE AND OBJECTIVE BOX
POD#1 s/p Right TSA   68yFemale Patient seen and examined with Dr. Marcelino, Pain controlled  Patient Denies SOB, CP, N/V/D       PE: Right Shoulder/UE: Dressing C/D/I removed, HV intact removed, Steri-strips C/D/I, No Erythema, Sensation/motor intact, Radial 2+, FROM wrist/Fingers                         10.3   15.25 )-----------( 260      ( 12 Jun 2018 06:37 )             32.6       06-12    142  |  107  |  13  ----------------------------<  135<H>  3.7   |  27  |  0.64    Ca    8.4<L>      12 Jun 2018 06:37    TPro  6.6  /  Alb  3.1<L>  /  TBili  0.3  /  DBili  x   /  AST  21  /  ALT  20  /  AlkPhos  82  06-12        A: As above   P: Pain Control       DVT Prophylaxis      Incentive spirometry      PT NWB RUE      Isometric exercises      Discharge Planning      All the above discussed and understood by pt       Ortho to F/U

## 2018-06-12 NOTE — OCCUPATIONAL THERAPY INITIAL EVALUATION ADULT - COORDINATION ASSESSED, REHAB EVAL
heel to shin/finger to nose/intact; not tested in RUE due to s/p right TSA finger to nose/heel to shin/intact; not tested in RUE due to s/p right TSA

## 2018-06-12 NOTE — OCCUPATIONAL THERAPY INITIAL EVALUATION ADULT - ADDITIONAL COMMENTS
Prior to admission, pt was functioning in her roles, self sufficient, driving & ambulating independently. Pt is  right hand dominant and wears glasses. Presently, pt needs assistance with self-care tasks & functional mobility. The scale below depicts a picture of the pt's current level of functioning. Barthel Index: Feeding score:5  Bathing Score:0 Grooming Score:5  Dressing Score:5 Bowel Score:10  Bladder Score:10  Toilet Score:10  Transfer Score:10   Mobility Score:10  Stairs Score:0 Total Score: 65/100.

## 2018-06-20 DIAGNOSIS — E03.9 HYPOTHYROIDISM, UNSPECIFIED: ICD-10-CM

## 2018-06-20 DIAGNOSIS — M19.011 PRIMARY OSTEOARTHRITIS, RIGHT SHOULDER: ICD-10-CM

## 2018-06-20 DIAGNOSIS — I10 ESSENTIAL (PRIMARY) HYPERTENSION: ICD-10-CM

## 2018-06-20 DIAGNOSIS — E78.5 HYPERLIPIDEMIA, UNSPECIFIED: ICD-10-CM

## 2018-06-20 DIAGNOSIS — E11.9 TYPE 2 DIABETES MELLITUS WITHOUT COMPLICATIONS: ICD-10-CM

## 2018-06-20 DIAGNOSIS — M75.21 BICIPITAL TENDINITIS, RIGHT SHOULDER: ICD-10-CM

## 2021-10-15 NOTE — PHYSICAL THERAPY INITIAL EVALUATION ADULT - CRITERIA FOR SKILLED THERAPEUTIC INTERVENTIONS
Oriented - self; Oriented - place; Oriented - time anticipated equipment needs at discharge/risk reduction/prevention/rehab potential/impairments found/anticipated discharge recommendation/functional limitations in following categories

## 2022-07-20 PROBLEM — J30.2 OTHER SEASONAL ALLERGIC RHINITIS: Chronic | Status: ACTIVE | Noted: 2018-06-01

## 2022-07-20 PROBLEM — K21.9 GASTRO-ESOPHAGEAL REFLUX DISEASE WITHOUT ESOPHAGITIS: Chronic | Status: ACTIVE | Noted: 2018-06-01

## 2022-07-20 PROBLEM — E11.9 TYPE 2 DIABETES MELLITUS WITHOUT COMPLICATIONS: Chronic | Status: ACTIVE | Noted: 2018-06-01

## 2022-07-20 PROBLEM — E03.9 HYPOTHYROIDISM, UNSPECIFIED: Chronic | Status: ACTIVE | Noted: 2018-06-01

## 2022-07-20 PROBLEM — E78.5 HYPERLIPIDEMIA, UNSPECIFIED: Chronic | Status: ACTIVE | Noted: 2018-06-01

## 2022-07-20 PROBLEM — I10 ESSENTIAL (PRIMARY) HYPERTENSION: Chronic | Status: ACTIVE | Noted: 2018-06-01

## 2022-07-20 PROBLEM — Z00.00 ENCOUNTER FOR PREVENTIVE HEALTH EXAMINATION: Status: ACTIVE | Noted: 2022-07-20

## 2022-07-20 PROBLEM — G43.909 MIGRAINE, UNSPECIFIED, NOT INTRACTABLE, WITHOUT STATUS MIGRAINOSUS: Chronic | Status: ACTIVE | Noted: 2018-06-01

## 2022-07-21 ENCOUNTER — APPOINTMENT (OUTPATIENT)
Dept: ORTHOPEDIC SURGERY | Facility: CLINIC | Age: 72
End: 2022-07-21

## 2022-07-21 ENCOUNTER — TRANSCRIPTION ENCOUNTER (OUTPATIENT)
Age: 72
End: 2022-07-21

## 2022-07-21 VITALS — BODY MASS INDEX: 45 KG/M2 | WEIGHT: 280 LBS | HEIGHT: 66 IN

## 2022-07-21 DIAGNOSIS — E11.9 TYPE 2 DIABETES MELLITUS W/OUT COMPLICATIONS: ICD-10-CM

## 2022-07-21 DIAGNOSIS — E78.00 PURE HYPERCHOLESTEROLEMIA, UNSPECIFIED: ICD-10-CM

## 2022-07-21 DIAGNOSIS — M17.11 UNILATERAL PRIMARY OSTEOARTHRITIS, RIGHT KNEE: ICD-10-CM

## 2022-07-21 DIAGNOSIS — M19.90 UNSPECIFIED OSTEOARTHRITIS, UNSPECIFIED SITE: ICD-10-CM

## 2022-07-21 DIAGNOSIS — I10 ESSENTIAL (PRIMARY) HYPERTENSION: ICD-10-CM

## 2022-07-21 PROCEDURE — 20610 DRAIN/INJ JOINT/BURSA W/O US: CPT | Mod: RT

## 2022-07-21 PROCEDURE — 99214 OFFICE O/P EST MOD 30 MIN: CPT | Mod: 25

## 2022-07-21 NOTE — PROCEDURE
[Large Joint Injection] : Large joint injection [Right] : of the right [Knee] : knee [___ cc    1%] : Lidocaine ~Vcc of 1%  [___ cc    40mg] : Methylprednisolone (Depomedrol) ~Vcc of 40 mg

## 2022-07-22 NOTE — HISTORY OF PRESENT ILLNESS
[8] : 8 [4] : 4 [Burning] : burning [Dull/Aching] : dull/aching [] : yes [Constant] : constant [Household chores] : household chores [Leisure] : leisure [Rest] : rest [Retired] : Work status: retired [de-identified] : 07/21/2022: RT knee \par Patient is here with worsening right knee pain. Pt is interested  in  a cortisone injection.\par ****RT Knee cortisone injection**** [FreeTextEntry1] : right knee [FreeTextEntry7] : up and down her leg [FreeTextEntry9] : voltaren  [de-identified] : none

## 2022-07-22 NOTE — PHYSICAL EXAM
[Right] : right knee [FreeTextEntry3] : \par Right Knee: Inspection of the knee is as follows: mild effusion and valgus deformity. Palpation of the knee is a\par follows: lateral joint line tenderness and lateral facet of patella tenderness.

## 2022-07-25 ENCOUNTER — TRANSCRIPTION ENCOUNTER (OUTPATIENT)
Age: 72
End: 2022-07-25

## 2023-03-28 ENCOUNTER — APPOINTMENT (OUTPATIENT)
Dept: ORTHOPEDIC SURGERY | Facility: CLINIC | Age: 73
End: 2023-03-28
Payer: MEDICARE

## 2023-03-28 VITALS — WEIGHT: 277 LBS | HEIGHT: 66 IN | BODY MASS INDEX: 44.52 KG/M2

## 2023-03-28 DIAGNOSIS — Z78.9 OTHER SPECIFIED HEALTH STATUS: ICD-10-CM

## 2023-03-28 PROCEDURE — 73030 X-RAY EXAM OF SHOULDER: CPT | Mod: RT

## 2023-03-28 PROCEDURE — 99214 OFFICE O/P EST MOD 30 MIN: CPT

## 2023-03-28 PROCEDURE — 73010 X-RAY EXAM OF SHOULDER BLADE: CPT | Mod: LT

## 2023-03-28 NOTE — ASSESSMENT
[FreeTextEntry1] : L TSA 2015 (Dr. Porter),  R TSA 6/11/18\par L TSA xrays stable.\par R TSA humeral osteolysis.\par CBC, ESR, CRP\par CT arthrogram with 3D recons, eval loosening, RCT.\par RTO for review.

## 2023-03-28 NOTE — HISTORY OF PRESENT ILLNESS
[5] : 5 [Dull/Aching] : dull/aching [Localized] : localized [Intermittent] : intermittent [Sleep] : sleep [de-identified] : DOS 2015 (Dr. Porter) L TSA\par DOS: 6/11/18 R TSA\par \par 3/28/23:  Here for follow up.  She has been doing well but noticed increasing pain in the L shoulder.  She has pain at night.  Shew has pain laterally and deep.   Denies fevers chills. \par \par 1/21/20: She has noticed increasing pain in the left shoulder. She has anterior shoulder pain. She tried rest, ice and NSAIDs without relief.\par \par 6/11/19: Here for follow up, now about one year post op. Doing well.\par \par 12/11/18: Here for follow up. She has stopped PT but she does do an HEP. She has some discomfort but she feels improved.\par \par 9/11/18: Here for follow up. She continues to improve with PT. She is using her arm for ADL's. No meds for pain.\par \par 7/24/18: Here for follow up. She has been in PT. She is improving.\par \par 6/26/18: Here for first post op. Doing well. No complaints. Has weaned from pain meds, using Tylenol prn. In sling. No f/c/s.\par \par 4/3/18: Here for MRI review.\par \par MRI R shoulder:\par 1. Supraspinatus tendinosis with anterior edge fraying.\par 2. Infraspinatus tendinosis with intrasubstance longitudinal partial tear.\par 3. Prominent biceps tendinosis with intrasubstance longitudinal partial tear.\par 4. Subscapularis tendinosis with intrasubstance longitudinal partial tear.\par 5. Severe glenohumeral joint osteoarthropathy with full-thickness cartilage loss, joint effusion, synovitis and large joint bodies further detailed above.\par 6. Extensive frayed degenerative tearing of the anterior, superior and posterior labrum.\par 7. Findings suggesting prior acromioplasty.\par \par 3/27/18: Here for f/u bilateral shoulder pain. Left side is improved with AC cortisone injection last visit. Pain right\par \par shoulder is getting worse. She cannot reach for things out of the cupboard as much because she cannot trust the right shoulder. Difficulty writing, penmanship and signature has changed. Stiffness increased in am. Positional pain QHS. Ice/heat, Diclofenac.\par \par 2/13/18: Returns with increasing pain left shoulder and decreased motion since December. When she reaches up to get something she feels catching sensation. Unable to reach [place] easily and behind her back. Occasional pain QHS. No n/t or weakness. She continues to have right shoulder symptoms. No relief with Orthovisc series October.\par \par 10/10/17: Given orthovisc #4 today.\par \par 10/3/17: Given orthovisc #3 today.\par \par 9/26/17: Given orthovisc #2 today.\par \par 9/19/17: 68 y/o RHD female here for follow up recurring R shoulder pain. No relief with cortisone injection June. Requesting gel shots. Symptoms unchanged. Also here for f/u evaluation L shoulder S/P L TSR 2015 with Dr. Porter.  Feels "tight" when she goes to reach for things, has difficulty getting things from high cabinet. Pain is intermittent with activity. Positional pain QHS R > L. No radiating symptoms on the L. Voltaren gel helps some. Alternates with Diclofenac once daily.\par \par 6/20/17: 68 y/o RHD female here for the R shoulder. She had a TSA by Dr. Porter in 2015, for which she still has some pain. She had a R RCR by Dr. Wu in 2008. In the last 6 months it has been causing more pain in the shoulder. Pain at rest, pain with activity. Pain in the shoulder and sometimes into lateral arm, less so into forearm. Denies paresthesias. Takes diclofenac. No injections, no PT. 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 She had RCR surgery in 2002 by Dr. Wu. In Nov 2016, the pain increased, now affecting her sleep and reaching. there is no numbness. She had a L TSR by Dr. Porter in 2015, which is doing\par  [] : no [FreeTextEntry1] : L shoulder [FreeTextEntry5] : Patient had a L shoulder surgery 2013.. No recent injury. Increasing pain in the last few months in the L shoulder. [de-identified] : none

## 2023-03-28 NOTE — PHYSICAL EXAM
[Bilateral] : shoulder bilaterally [5 ___] : forward flexion 5[unfilled]/5 [5___] : external rotation 5[unfilled]/5 [] : discomfort with strength testing [FreeTextEntry9] : FE: R 130, L 120\par ER: R 40, L 50

## 2023-03-28 NOTE — IMAGING
[Left] : left shoulder [Components well fixed, in good position] : Components well fixed, in good position [FreeTextEntry1] : L shoulder- medial calcar resorption

## 2023-04-03 ENCOUNTER — RESULT REVIEW (OUTPATIENT)
Age: 73
End: 2023-04-03

## 2023-04-06 LAB
BASOPHILS # BLD AUTO: 0.03 K/UL
BASOPHILS NFR BLD AUTO: 0.4 %
CRP SERPL-MCNC: <3 MG/L
EOSINOPHIL # BLD AUTO: 0.27 K/UL
EOSINOPHIL NFR BLD AUTO: 3.9 %
ERYTHROCYTE [SEDIMENTATION RATE] IN BLOOD BY WESTERGREN METHOD: 17 MM/HR
HCT VFR BLD CALC: 42.3 %
HGB BLD-MCNC: 12.8 G/DL
IMM GRANULOCYTES NFR BLD AUTO: 0.1 %
LYMPHOCYTES # BLD AUTO: 2.03 K/UL
LYMPHOCYTES NFR BLD AUTO: 29.6 %
MAN DIFF?: NORMAL
MCHC RBC-ENTMCNC: 30 PG
MCHC RBC-ENTMCNC: 30.3 GM/DL
MCV RBC AUTO: 99.3 FL
MONOCYTES # BLD AUTO: 0.54 K/UL
MONOCYTES NFR BLD AUTO: 7.9 %
NEUTROPHILS # BLD AUTO: 3.98 K/UL
NEUTROPHILS NFR BLD AUTO: 58.1 %
PLATELET # BLD AUTO: 239 K/UL
RBC # BLD: 4.26 M/UL
RBC # FLD: 13.8 %
WBC # FLD AUTO: 6.86 K/UL

## 2023-04-25 ENCOUNTER — APPOINTMENT (OUTPATIENT)
Dept: ORTHOPEDIC SURGERY | Facility: CLINIC | Age: 73
End: 2023-04-25
Payer: MEDICARE

## 2023-04-25 VITALS — BODY MASS INDEX: 44.52 KG/M2 | HEIGHT: 66 IN | WEIGHT: 277 LBS

## 2023-04-25 DIAGNOSIS — Z96.611 PRESENCE OF RIGHT ARTIFICIAL SHOULDER JOINT: ICD-10-CM

## 2023-04-25 PROCEDURE — 99215 OFFICE O/P EST HI 40 MIN: CPT

## 2023-04-25 NOTE — DATA REVIEWED
[CT Scan] : CT scan [Left] : left [Shoulder] : shoulder [I independently reviewed and interpreted images and report] : I independently reviewed and interpreted images and report [FreeTextEntry1] : partial thickness RCT, signs of glenoid loosening

## 2023-04-25 NOTE — ASSESSMENT
[FreeTextEntry1] : L TSA 2015 (Dr. Porter),  R TSA 6/11/18\par L TSA xrays stable.\par R TSA humeral osteolysis.\par CT reviewed: partial thickness RCT, signs of glenoid loosening\par CBC, ESR, CRP normal. \par Discussed op versus non op tx, including the r/b/a/c of both.\par Discussed revision TSA to RSA.\par Discussed possible need for bone grafting.\par She will think about it.

## 2023-04-25 NOTE — HISTORY OF PRESENT ILLNESS
[5] : 5 [Dull/Aching] : dull/aching [Localized] : localized [Intermittent] : intermittent [Sleep] : sleep [de-identified] : DOS 2015 (Dr. Porter) L TSA\par DOS: 6/11/18 R TSA\par \par 4/25/23: Here for CT review.\par \par WBC: 6.86\par ESR: 17\par CRP:  <3\par \par CT arthrogram L shoulder:  Status post left shoulder arthroplasty with associated artifact limiting evaluation. Partial supraspinatus, infraspinatus, and subscapularis tendon tears with contrast delamination, as above.\par \par 3/28/23:  Here for follow up.  She has been doing well but noticed increasing pain in the L shoulder.  She has pain at night.  Shew has pain laterally and deep.   Denies fevers chills. \par \par 1/21/20: She has noticed increasing pain in the left shoulder. She has anterior shoulder pain. She tried rest, ice and NSAIDs without relief.\par \par 6/11/19: Here for follow up, now about one year post op. Doing well.\par \par 12/11/18: Here for follow up. She has stopped PT but she does do an HEP. She has some discomfort but she feels improved.\par \par 9/11/18: Here for follow up. She continues to improve with PT. She is using her arm for ADL's. No meds for pain.\par \par 7/24/18: Here for follow up. She has been in PT. She is improving.\par \par 6/26/18: Here for first post op. Doing well. No complaints. Has weaned from pain meds, using Tylenol prn. In sling. No f/c/s.\par \par 4/3/18: Here for MRI review.\par \par MRI R shoulder:\par 1. Supraspinatus tendinosis with anterior edge fraying.\par 2. Infraspinatus tendinosis with intrasubstance longitudinal partial tear.\par 3. Prominent biceps tendinosis with intrasubstance longitudinal partial tear.\par 4. Subscapularis tendinosis with intrasubstance longitudinal partial tear.\par 5. Severe glenohumeral joint osteoarthropathy with full-thickness cartilage loss, joint effusion, synovitis and large joint bodies further detailed above.\par 6. Extensive frayed degenerative tearing of the anterior, superior and posterior labrum.\par 7. Findings suggesting prior acromioplasty.\par \par 3/27/18: Here for f/u bilateral shoulder pain. Left side is improved with AC cortisone injection last visit. Pain right\par \par shoulder is getting worse. She cannot reach for things out of the cupboard as much because she cannot trust the right shoulder. Difficulty writing, penmanship and signature has changed. Stiffness increased in am. Positional pain QHS. Ice/heat, Diclofenac.\par \par 2/13/18: Returns with increasing pain left shoulder and decreased motion since December. When she reaches up to get something she feels catching sensation. Unable to reach [place] easily and behind her back. Occasional pain QHS. No n/t or weakness. She continues to have right shoulder symptoms. No relief with Orthovisc series October.\par \par 10/10/17: Given orthovisc #4 today.\par \par 10/3/17: Given orthovisc #3 today.\par \par 9/26/17: Given orthovisc #2 today.\par \par 9/19/17: 68 y/o RHD female here for follow up recurring R shoulder pain. No relief with cortisone injection June. Requesting gel shots. Symptoms unchanged. Also here for f/u evaluation L shoulder S/P L TSR 2015 with Dr. Porter.  Feels "tight" when she goes to reach for things, has difficulty getting things from high cabinet. Pain is intermittent with activity. Positional pain QHS R > L. No radiating symptoms on the L. Voltaren gel helps some. Alternates with Diclofenac once daily.\par \par 6/20/17: 68 y/o RHD female here for the R shoulder. She had a TSA by Dr. Porter in 2015, for which she still has some pain. She had a R RCR by Dr. Wu in 2008. In the last 6 months it has been causing more pain in the shoulder. Pain at rest, pain with activity. Pain in the shoulder and sometimes into lateral arm, less so into forearm. Denies paresthesias. Takes diclofenac. No injections, no PT. 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 She had RCR surgery in 2002 by Dr. Wu. In Nov 2016, the pain increased, now affecting her sleep and reaching. there is no numbness. She had a L TSR by Dr. Porter in 2015, which is doing\par  [] : no [FreeTextEntry1] : L shoulder [FreeTextEntry5] : Patient had a L shoulder surgery 2013.. No recent injury. Increasing pain in the last few months in the L shoulder. [de-identified] : none

## 2024-06-17 ENCOUNTER — APPOINTMENT (OUTPATIENT)
Dept: ORTHOPEDIC SURGERY | Facility: CLINIC | Age: 74
End: 2024-06-17
Payer: MEDICARE

## 2024-06-17 VITALS — HEIGHT: 66 IN | WEIGHT: 277 LBS | BODY MASS INDEX: 44.52 KG/M2

## 2024-06-17 PROCEDURE — 99213 OFFICE O/P EST LOW 20 MIN: CPT | Mod: 57

## 2024-06-17 PROCEDURE — 26750 TREAT FINGER FRACTURE EACH: CPT | Mod: RT

## 2024-06-17 NOTE — PHYSICAL EXAM
[de-identified] : R thumb  Tender Distal phalanx Swelling Stiffness Has active IP flex/ext  Xrays distal phalanx fx

## 2024-06-17 NOTE — ASSESSMENT
[FreeTextEntry1] : Thumb splint Activity modification as tolerated Return in 2 weeks - Xrays upon return

## 2024-06-17 NOTE — HISTORY OF PRESENT ILLNESS
[de-identified] : R thumb injury about 1 week ago She fell on R thumb  Pain and swelling [6] : 6 [Dull/Aching] : dull/aching [Ice] : ice [] : yes [FreeTextEntry1] : R thumb [FreeTextEntry3] : 6/9/24 [FreeTextEntry5] : she fell she went to [facility] MD 6/13-xr,fx,brace,tylenol [de-identified] : activyt

## 2024-06-27 ENCOUNTER — APPOINTMENT (OUTPATIENT)
Dept: ORTHOPEDIC SURGERY | Facility: CLINIC | Age: 74
End: 2024-06-27
Payer: MEDICARE

## 2024-06-27 VITALS — BODY MASS INDEX: 44.52 KG/M2 | HEIGHT: 66 IN | WEIGHT: 277 LBS

## 2024-06-27 DIAGNOSIS — Z96.612 PRESENCE OF LEFT ARTIFICIAL SHOULDER JOINT: ICD-10-CM

## 2024-06-27 PROCEDURE — 73030 X-RAY EXAM OF SHOULDER: CPT | Mod: LT

## 2024-06-27 PROCEDURE — 99214 OFFICE O/P EST MOD 30 MIN: CPT

## 2024-06-27 PROCEDURE — 73010 X-RAY EXAM OF SHOULDER BLADE: CPT | Mod: LT

## 2024-07-01 ENCOUNTER — APPOINTMENT (OUTPATIENT)
Dept: ORTHOPEDIC SURGERY | Facility: CLINIC | Age: 74
End: 2024-07-01
Payer: MEDICARE

## 2024-07-01 VITALS — BODY MASS INDEX: 44.52 KG/M2 | WEIGHT: 277 LBS | HEIGHT: 66 IN

## 2024-07-01 DIAGNOSIS — S62.524A NONDISPLACED FRACTURE OF DISTAL PHALANX OF RIGHT THUMB, INITIAL ENCOUNTER FOR CLOSED FRACTURE: ICD-10-CM

## 2024-07-01 PROCEDURE — 99024 POSTOP FOLLOW-UP VISIT: CPT

## 2024-07-01 PROCEDURE — 73140 X-RAY EXAM OF FINGER(S): CPT | Mod: RT

## 2024-07-25 ENCOUNTER — OFFICE (OUTPATIENT)
Dept: URBAN - METROPOLITAN AREA CLINIC 109 | Facility: CLINIC | Age: 74
Setting detail: OPHTHALMOLOGY
End: 2024-07-25
Payer: MEDICARE

## 2024-07-25 DIAGNOSIS — H40.033: ICD-10-CM

## 2024-07-25 DIAGNOSIS — H25.13: ICD-10-CM

## 2024-07-25 PROBLEM — E11.9: Status: ACTIVE | Noted: 2024-07-25

## 2024-07-25 PROCEDURE — 99204 OFFICE O/P NEW MOD 45 MIN: CPT | Performed by: OPHTHALMOLOGY

## 2024-07-25 PROCEDURE — 92133 CPTRZD OPH DX IMG PST SGM ON: CPT | Performed by: OPHTHALMOLOGY

## 2024-07-25 PROCEDURE — 92020 GONIOSCOPY: CPT | Performed by: OPHTHALMOLOGY

## 2024-07-25 PROCEDURE — 76514 ECHO EXAM OF EYE THICKNESS: CPT | Performed by: OPHTHALMOLOGY

## 2024-07-25 ASSESSMENT — CONFRONTATIONAL VISUAL FIELD TEST (CVF)
OD_FINDINGS: FULL
OS_FINDINGS: FULL

## 2024-09-18 ENCOUNTER — OFFICE (OUTPATIENT)
Dept: URBAN - METROPOLITAN AREA CLINIC 109 | Facility: CLINIC | Age: 74
Setting detail: OPHTHALMOLOGY
End: 2024-09-18
Payer: MEDICARE

## 2024-09-18 DIAGNOSIS — H25.11: ICD-10-CM

## 2024-09-18 DIAGNOSIS — H25.13: ICD-10-CM

## 2024-09-18 PROCEDURE — 92136 OPHTHALMIC BIOMETRY: CPT | Mod: TC | Performed by: OPHTHALMOLOGY

## 2024-09-18 PROCEDURE — 99213 OFFICE O/P EST LOW 20 MIN: CPT | Performed by: OPHTHALMOLOGY

## 2024-09-18 PROCEDURE — 92136 OPHTHALMIC BIOMETRY: CPT | Mod: 26,RT | Performed by: OPHTHALMOLOGY

## 2024-09-18 ASSESSMENT — CONFRONTATIONAL VISUAL FIELD TEST (CVF)
OS_FINDINGS: FULL
OD_FINDINGS: FULL

## 2024-10-01 ENCOUNTER — AMBULATORY SURGERY CENTER (OUTPATIENT)
Dept: URBAN - METROPOLITAN AREA SURGERY 19 | Facility: SURGERY | Age: 74
Setting detail: OPHTHALMOLOGY
End: 2024-10-01
Payer: MEDICARE

## 2024-10-01 DIAGNOSIS — H25.11: ICD-10-CM

## 2024-10-01 PROCEDURE — 66984 XCAPSL CTRC RMVL W/O ECP: CPT | Mod: RT | Performed by: OPHTHALMOLOGY

## 2024-10-02 ENCOUNTER — RX ONLY (RX ONLY)
Age: 74
End: 2024-10-02

## 2024-10-02 ENCOUNTER — OFFICE (OUTPATIENT)
Dept: URBAN - METROPOLITAN AREA CLINIC 109 | Facility: CLINIC | Age: 74
Setting detail: OPHTHALMOLOGY
End: 2024-10-02
Payer: MEDICARE

## 2024-10-02 DIAGNOSIS — Z96.1: ICD-10-CM

## 2024-10-02 PROCEDURE — 99024 POSTOP FOLLOW-UP VISIT: CPT | Performed by: OPTOMETRIST

## 2024-10-02 ASSESSMENT — PACHYMETRY
OD_CT_CORRECTION: -1
OS_CT_CORRECTION: -1
OS_CT_UM: 556
OD_CT_UM: 555

## 2024-10-02 ASSESSMENT — TONOMETRY
OS_IOP_MMHG: 18
OD_IOP_MMHG: 17

## 2024-10-02 ASSESSMENT — VISUAL ACUITY
OD_BCVA: 20/25-2
OS_BCVA: 20/150

## 2024-10-02 ASSESSMENT — CONFRONTATIONAL VISUAL FIELD TEST (CVF)
OS_FINDINGS: FULL
OD_FINDINGS: FULL

## 2024-10-02 ASSESSMENT — REFRACTION_AUTOREFRACTION
OD_AXIS: 107
OD_SPHERE: -3.25
OS_CYLINDER: -0.75
OD_CYLINDER: -0.50
OS_AXIS: 154
OS_SPHERE: -3.00

## 2024-10-02 ASSESSMENT — KERATOMETRY
OD_K1POWER_DIOPTERS: 41.50
OD_AXISANGLE_DEGREES: 090
OS_K2POWER_DIOPTERS: 41.75
OS_AXISANGLE_DEGREES: 096
OS_K1POWER_DIOPTERS: 41.00
OD_K2POWER_DIOPTERS: 41.50

## 2024-10-02 ASSESSMENT — REFRACTION_CURRENTRX
OS_CYLINDER: -0.50
OS_ADD: +2.50
OD_AXIS: 030
OS_AXIS: 165
OS_SPHERE: -3.00
OD_ADD: +2.50
OD_OVR_VA: 20/
OD_SPHERE: -2.75
OS_OVR_VA: 20/
OD_CYLINDER: -0.50

## 2024-10-02 ASSESSMENT — CORNEAL TRAUMA - EPITHELIUM: OD_EPITHELIALDYSTROPHY: IRREGULAR EPITHELIUM

## 2024-10-02 ASSESSMENT — REFRACTION_MANIFEST
OD_VA1: 20/NI
OD_CYLINDER: -0.50
OD_AXIS: 110
OD_SPHERE: -3.00

## 2024-10-02 ASSESSMENT — CORNEAL EDEMA CLINICAL DESCRIPTION: OD_CORNEALEDEMA: 1+ 2+

## 2024-10-09 ENCOUNTER — OFFICE (OUTPATIENT)
Dept: URBAN - METROPOLITAN AREA CLINIC 109 | Facility: CLINIC | Age: 74
Setting detail: OPHTHALMOLOGY
End: 2024-10-09
Payer: MEDICARE

## 2024-10-09 DIAGNOSIS — H25.12: ICD-10-CM

## 2024-10-09 DIAGNOSIS — Z96.1: ICD-10-CM

## 2024-10-09 PROCEDURE — 99024 POSTOP FOLLOW-UP VISIT: CPT | Performed by: OPHTHALMOLOGY

## 2024-10-09 PROCEDURE — 92136 OPHTHALMIC BIOMETRY: CPT | Mod: 26,LT | Performed by: OPHTHALMOLOGY

## 2024-10-09 ASSESSMENT — REFRACTION_CURRENTRX
OS_AXIS: 165
OD_ADD: +2.50
OS_CYLINDER: -0.50
OD_SPHERE: -2.75
OS_ADD: +2.50
OD_AXIS: 030
OD_OVR_VA: 20/
OS_OVR_VA: 20/
OS_SPHERE: -3.00
OD_CYLINDER: -0.50

## 2024-10-09 ASSESSMENT — REFRACTION_MANIFEST
OD_CYLINDER: -0.50
OS_CYLINDER: -0.50
OS_SPHERE: -2.75
OD_AXIS: 110
OD_SPHERE: -3.00
OS_AXIS: 170
OD_VA1: 20/NI
OS_VA1: 20/NI

## 2024-10-09 ASSESSMENT — CONFRONTATIONAL VISUAL FIELD TEST (CVF)
OS_FINDINGS: FULL
OD_FINDINGS: FULL

## 2024-10-09 ASSESSMENT — CORNEAL TRAUMA - EPITHELIUM: OD_EPITHELIALDYSTROPHY: IRREGULAR EPITHELIUM

## 2024-10-09 ASSESSMENT — VISUAL ACUITY
OS_BCVA: 20/20-2
OD_BCVA: 20/125

## 2024-10-09 ASSESSMENT — REFRACTION_AUTOREFRACTION
OD_SPHERE: -0.50
OS_AXIS: 168
OS_SPHERE: -2.75
OD_CYLINDER: -0.25
OD_AXIS: 087
OS_CYLINDER: -0.75

## 2024-10-09 ASSESSMENT — KERATOMETRY
OS_K2POWER_DIOPTERS: 41.75
OD_AXISANGLE_DEGREES: 090
OS_K1POWER_DIOPTERS: 41.00
OD_K1POWER_DIOPTERS: 41.50
OD_K2POWER_DIOPTERS: 41.50
OS_AXISANGLE_DEGREES: 096

## 2024-10-09 ASSESSMENT — PACHYMETRY
OS_CT_CORRECTION: -1
OD_CT_UM: 555
OS_CT_UM: 556
OD_CT_CORRECTION: -1

## 2024-10-09 ASSESSMENT — TONOMETRY
OD_IOP_MMHG: 19
OS_IOP_MMHG: 18

## 2024-10-15 ENCOUNTER — AMBULATORY SURGERY CENTER (OUTPATIENT)
Dept: URBAN - METROPOLITAN AREA SURGERY 19 | Facility: SURGERY | Age: 74
Setting detail: OPHTHALMOLOGY
End: 2024-10-15
Payer: MEDICARE

## 2024-10-15 DIAGNOSIS — H25.12: ICD-10-CM

## 2024-10-15 PROCEDURE — 66984 XCAPSL CTRC RMVL W/O ECP: CPT | Mod: 79,LT | Performed by: OPHTHALMOLOGY

## 2024-10-16 ENCOUNTER — OFFICE (OUTPATIENT)
Dept: URBAN - METROPOLITAN AREA CLINIC 109 | Facility: CLINIC | Age: 74
Setting detail: OPHTHALMOLOGY
End: 2024-10-16
Payer: MEDICARE

## 2024-10-16 DIAGNOSIS — Z96.1: ICD-10-CM

## 2024-10-16 PROCEDURE — 99024 POSTOP FOLLOW-UP VISIT: CPT | Performed by: OPTOMETRIST

## 2024-10-16 ASSESSMENT — REFRACTION_AUTOREFRACTION
OS_SPHERE: +0.25
OS_CYLINDER: -0.25
OD_AXIS: 085
OD_CYLINDER: -0.50
OD_SPHERE: -0.50
OS_AXIS: 168

## 2024-10-16 ASSESSMENT — VISUAL ACUITY
OD_BCVA: 20/20
OS_BCVA: 20/25-

## 2024-10-16 ASSESSMENT — REFRACTION_CURRENTRX
OD_ADD: +2.50
OS_SPHERE: -3.00
OD_CYLINDER: -0.50
OS_AXIS: 165
OS_CYLINDER: -0.50
OD_AXIS: 030
OD_SPHERE: -2.75
OS_OVR_VA: 20/
OD_OVR_VA: 20/
OS_ADD: +2.50

## 2024-10-16 ASSESSMENT — PACHYMETRY
OS_CT_UM: 556
OD_CT_CORRECTION: -1
OS_CT_CORRECTION: -1
OD_CT_UM: 555

## 2024-10-16 ASSESSMENT — TONOMETRY
OS_IOP_MMHG: 18
OD_IOP_MMHG: 16

## 2024-10-16 ASSESSMENT — KERATOMETRY
OD_K1POWER_DIOPTERS: 41.50
OS_K1POWER_DIOPTERS: 41.00
OS_K2POWER_DIOPTERS: 41.75
OD_K2POWER_DIOPTERS: 41.50
OS_AXISANGLE_DEGREES: 096
OD_AXISANGLE_DEGREES: 090

## 2024-10-16 ASSESSMENT — REFRACTION_MANIFEST
OD_SPHERE: -3.00
OS_AXIS: 170
OS_VA1: 20/NI
OS_CYLINDER: -0.50
OD_AXIS: 110
OD_VA1: 20/NI
OD_CYLINDER: -0.50
OS_SPHERE: -2.75

## 2024-10-16 ASSESSMENT — CORNEAL TRAUMA - EPITHELIUM: OD_EPITHELIALDYSTROPHY: IRREGULAR EPITHELIUM

## 2024-11-07 ENCOUNTER — OFFICE (OUTPATIENT)
Dept: URBAN - METROPOLITAN AREA CLINIC 109 | Facility: CLINIC | Age: 74
Setting detail: OPHTHALMOLOGY
End: 2024-11-07
Payer: MEDICARE

## 2024-11-07 DIAGNOSIS — Z96.1: ICD-10-CM

## 2024-11-07 PROCEDURE — 99024 POSTOP FOLLOW-UP VISIT: CPT | Performed by: OPTOMETRIST

## 2024-11-07 ASSESSMENT — KERATOMETRY
OS_K2POWER_DIOPTERS: 41.75
OD_K2POWER_DIOPTERS: 41.50
OS_K1POWER_DIOPTERS: 41.00
OS_AXISANGLE_DEGREES: 096
OD_K1POWER_DIOPTERS: 41.50
OD_AXISANGLE_DEGREES: 090

## 2024-11-07 ASSESSMENT — CORNEAL TRAUMA - EPITHELIUM: OD_EPITHELIALDYSTROPHY: IRREGULAR EPITHELIUM

## 2024-11-07 ASSESSMENT — REFRACTION_MANIFEST
OD_CYLINDER: -0.50
OS_SPHERE: -2.75
OD_AXIS: 110
OS_VA1: 20/NI
OS_AXIS: 170
OS_CYLINDER: -0.50
OD_SPHERE: -3.00
OD_VA1: 20/NI

## 2024-11-07 ASSESSMENT — CONFRONTATIONAL VISUAL FIELD TEST (CVF)
OS_FINDINGS: FULL
OD_FINDINGS: FULL

## 2024-11-07 ASSESSMENT — TONOMETRY
OD_IOP_MMHG: 13
OS_IOP_MMHG: 16

## 2024-11-07 ASSESSMENT — VISUAL ACUITY
OS_BCVA: 20/25
OD_BCVA: 20/20-1

## 2024-11-07 ASSESSMENT — REFRACTION_CURRENTRX
OS_CYLINDER: -0.50
OD_AXIS: 030
OD_SPHERE: -2.75
OD_ADD: +2.50
OS_AXIS: 165
OS_ADD: +2.50
OS_OVR_VA: 20/
OD_OVR_VA: 20/
OD_CYLINDER: -0.50
OS_SPHERE: -3.00

## 2024-11-07 ASSESSMENT — REFRACTION_AUTOREFRACTION
OS_AXIS: 026
OS_CYLINDER: -0.25
OD_AXIS: 102
OD_CYLINDER: -0.50
OD_SPHERE: -0.50
OS_SPHERE: -0.75

## 2024-11-07 ASSESSMENT — PACHYMETRY
OS_CT_UM: 556
OD_CT_CORRECTION: -1
OD_CT_UM: 555
OS_CT_CORRECTION: -1

## 2024-11-13 ENCOUNTER — APPOINTMENT (OUTPATIENT)
Dept: VASCULAR SURGERY | Facility: CLINIC | Age: 74
End: 2024-11-13
Payer: MEDICARE

## 2024-11-13 VITALS
HEIGHT: 66 IN | HEART RATE: 74 BPM | SYSTOLIC BLOOD PRESSURE: 161 MMHG | BODY MASS INDEX: 43.07 KG/M2 | DIASTOLIC BLOOD PRESSURE: 88 MMHG | TEMPERATURE: 98 F | WEIGHT: 268 LBS

## 2024-11-13 DIAGNOSIS — I87.2 VENOUS INSUFFICIENCY (CHRONIC) (PERIPHERAL): ICD-10-CM

## 2024-11-13 DIAGNOSIS — I89.0 LYMPHEDEMA, NOT ELSEWHERE CLASSIFIED: ICD-10-CM

## 2024-11-13 DIAGNOSIS — I83.893 VARICOSE VEINS OF BILATERAL LOWER EXTREMITIES WITH OTHER COMPLICATIONS: ICD-10-CM

## 2024-11-13 PROCEDURE — 93970 EXTREMITY STUDY: CPT

## 2024-11-13 PROCEDURE — 99204 OFFICE O/P NEW MOD 45 MIN: CPT

## 2024-11-15 PROBLEM — I89.0 LYMPHEDEMA OF BOTH LOWER EXTREMITIES: Status: ACTIVE | Noted: 2024-11-15

## 2024-11-15 PROBLEM — I87.2 CHRONIC VENOUS INSUFFICIENCY: Status: ACTIVE | Noted: 2024-11-15

## 2024-11-15 PROBLEM — I83.893 SYMPTOMATIC VARICOSE VEINS OF BOTH LOWER EXTREMITIES: Status: ACTIVE | Noted: 2024-11-15

## 2024-11-15 RX ORDER — CLOBETASOL PROPIONATE 0.5 MG/G
0.05 OINTMENT TOPICAL
Refills: 0 | Status: ACTIVE | COMMUNITY

## 2024-11-15 RX ORDER — DICLOFENAC SODIUM 75 MG/1
75 TABLET, DELAYED RELEASE ORAL
Refills: 0 | Status: ACTIVE | COMMUNITY

## 2024-11-15 RX ORDER — AMLODIPINE BESYLATE 10 MG/1
10 TABLET ORAL
Refills: 0 | Status: ACTIVE | COMMUNITY

## 2024-11-15 RX ORDER — ATORVASTATIN CALCIUM 10 MG/1
10 TABLET, FILM COATED ORAL
Refills: 0 | Status: ACTIVE | COMMUNITY

## 2024-11-15 RX ORDER — LIRAGLUTIDE 6 MG/ML
18 INJECTION SUBCUTANEOUS
Refills: 0 | Status: ACTIVE | COMMUNITY

## 2024-11-15 RX ORDER — ENALAPRIL MALEATE 5 MG/1
5 TABLET ORAL
Refills: 0 | Status: ACTIVE | COMMUNITY

## 2024-11-15 RX ORDER — LEVOTHYROXINE SODIUM 137 UG/1
137 CAPSULE ORAL
Refills: 0 | Status: ACTIVE | COMMUNITY

## 2024-11-15 RX ORDER — NADOLOL 40 MG/1
40 TABLET ORAL
Refills: 0 | Status: ACTIVE | COMMUNITY

## 2024-11-15 RX ORDER — GLIPIZIDE 5 MG/1
5 TABLET ORAL
Refills: 0 | Status: ACTIVE | COMMUNITY

## 2024-11-15 RX ORDER — PANTOPRAZOLE SODIUM 40 MG/1
40 GRANULE, DELAYED RELEASE ORAL
Refills: 0 | Status: ACTIVE | COMMUNITY

## 2025-01-10 RX ORDER — ALPRAZOLAM 0.25 MG/1
0.25 TABLET ORAL
Qty: 1 | Refills: 0 | Status: ACTIVE | COMMUNITY
Start: 2025-01-10 | End: 1900-01-01

## 2025-01-10 RX ORDER — LIDOCAINE HYDROCHLORIDE 10 MG/ML
1 INJECTION, SOLUTION INFILTRATION; PERINEURAL
Qty: 50 | Refills: 0 | Status: ACTIVE | COMMUNITY
Start: 2025-01-10 | End: 1900-01-01

## 2025-01-16 ENCOUNTER — APPOINTMENT (OUTPATIENT)
Dept: VASCULAR SURGERY | Facility: CLINIC | Age: 75
End: 2025-01-16
Payer: MEDICARE

## 2025-01-16 PROCEDURE — 36475 ENDOVENOUS RF 1ST VEIN: CPT | Mod: RT

## 2025-01-21 RX ORDER — LIDOCAINE HYDROCHLORIDE 10 MG/ML
1 INJECTION, SOLUTION INFILTRATION; PERINEURAL
Qty: 50 | Refills: 0 | Status: COMPLETED | COMMUNITY
Start: 2025-01-21 | End: 2025-01-23

## 2025-01-23 ENCOUNTER — APPOINTMENT (OUTPATIENT)
Dept: VASCULAR SURGERY | Facility: CLINIC | Age: 75
End: 2025-01-23
Payer: MEDICARE

## 2025-01-23 PROCEDURE — 93971 EXTREMITY STUDY: CPT | Mod: LT,XU

## 2025-01-23 PROCEDURE — 36475 ENDOVENOUS RF 1ST VEIN: CPT | Mod: LT

## 2025-01-23 RX ORDER — ALPRAZOLAM 0.25 MG/1
0.25 TABLET ORAL
Qty: 1 | Refills: 0 | Status: ACTIVE | COMMUNITY
Start: 2025-01-23 | End: 1900-01-01

## 2025-01-30 ENCOUNTER — APPOINTMENT (OUTPATIENT)
Dept: VASCULAR SURGERY | Facility: CLINIC | Age: 75
End: 2025-01-30
Payer: MEDICARE

## 2025-01-30 PROCEDURE — 36465Z: CUSTOM | Mod: RT

## 2025-01-30 PROCEDURE — 93971 EXTREMITY STUDY: CPT | Mod: LT,59

## 2025-02-03 RX ORDER — ALPRAZOLAM 0.25 MG/1
0.25 TABLET ORAL
Qty: 1 | Refills: 0 | Status: ACTIVE | COMMUNITY
Start: 2025-02-03 | End: 1900-01-01

## 2025-02-06 ENCOUNTER — APPOINTMENT (OUTPATIENT)
Dept: VASCULAR SURGERY | Facility: CLINIC | Age: 75
End: 2025-02-06
Payer: MEDICARE

## 2025-02-06 DIAGNOSIS — I83.893 VARICOSE VEINS OF BILATERAL LOWER EXTREMITIES WITH OTHER COMPLICATIONS: ICD-10-CM

## 2025-02-06 DIAGNOSIS — I87.2 VENOUS INSUFFICIENCY (CHRONIC) (PERIPHERAL): ICD-10-CM

## 2025-02-06 PROCEDURE — 36465Z: CUSTOM | Mod: LT

## 2025-02-06 PROCEDURE — 93931 UPPER EXTREMITY STUDY: CPT | Mod: RT

## 2025-02-13 ENCOUNTER — APPOINTMENT (OUTPATIENT)
Dept: VASCULAR SURGERY | Facility: CLINIC | Age: 75
End: 2025-02-13
Payer: MEDICARE

## 2025-02-13 PROCEDURE — 93971 EXTREMITY STUDY: CPT | Mod: LT

## 2025-03-12 ENCOUNTER — APPOINTMENT (OUTPATIENT)
Dept: VASCULAR SURGERY | Facility: CLINIC | Age: 75
End: 2025-03-12

## 2025-03-12 VITALS
DIASTOLIC BLOOD PRESSURE: 85 MMHG | BODY MASS INDEX: 43.07 KG/M2 | WEIGHT: 268 LBS | HEART RATE: 73 BPM | SYSTOLIC BLOOD PRESSURE: 145 MMHG | HEIGHT: 66 IN | TEMPERATURE: 97.8 F

## 2025-03-12 PROCEDURE — 99213 OFFICE O/P EST LOW 20 MIN: CPT
